# Patient Record
Sex: FEMALE | URBAN - METROPOLITAN AREA
[De-identification: names, ages, dates, MRNs, and addresses within clinical notes are randomized per-mention and may not be internally consistent; named-entity substitution may affect disease eponyms.]

---

## 2020-03-03 ENCOUNTER — OFFICE VISIT (OUTPATIENT)
Dept: URBAN - METROPOLITAN AREA CLINIC 58 | Facility: CLINIC | Age: 60
End: 2020-03-03

## 2020-03-03 DIAGNOSIS — B00.52 HERPESVIRAL KERATITIS: Primary | ICD-10-CM

## 2020-03-03 PROCEDURE — 99203 OFFICE O/P NEW LOW 30 MIN: CPT | Performed by: OPHTHALMOLOGY

## 2020-03-03 RX ORDER — ACYCLOVIR 400 MG/1
400 MG TABLET ORAL
Qty: 35 | Refills: 0 | Status: ACTIVE
Start: 2020-03-03

## 2020-03-03 ASSESSMENT — INTRAOCULAR PRESSURE
OD: 14
OS: 10

## 2020-03-03 NOTE — IMPRESSION/PLAN
Impression: Herpesviral keratitis: B00.52. Plan: Discussed diagnosis in detail with patient. Advised patient of condition. Start Acylovir 400mg PO 5 times a day for 1 wk. Recommend patient to use preservative free eye drops (OTC AT's). Will continue to monitor. Call if worse.

## 2020-03-09 ENCOUNTER — OFFICE VISIT (OUTPATIENT)
Dept: URBAN - METROPOLITAN AREA CLINIC 58 | Facility: CLINIC | Age: 60
End: 2020-03-09
Payer: COMMERCIAL

## 2020-03-09 DIAGNOSIS — H26.491 OTHER SECONDARY CATARACT, RIGHT EYE: ICD-10-CM

## 2020-03-09 PROCEDURE — 99213 OFFICE O/P EST LOW 20 MIN: CPT | Performed by: OPHTHALMOLOGY

## 2020-03-09 ASSESSMENT — INTRAOCULAR PRESSURE
OD: 11
OS: 11

## 2020-03-09 NOTE — IMPRESSION/PLAN
Impression: Herpesviral keratitis: B00.52. Plan: Discussed diagnosis in detail with patient. Advised patient of condition. Continue Acylovir 400mg PO BID until it runs out. Continue using AT's. Will continue to monitor. Call if worse.

## 2020-10-19 NOTE — IMPRESSION/PLAN
Impression: Other secondary cataract, right eye: H26.491. Plan: Discussed diagnosis in detail with patient. Advised patient of condition. No treatment is required at this time. Patient to monitor glare symptoms. Call if worse. Universal Safety Interventions